# Patient Record
Sex: MALE | Race: WHITE | HISPANIC OR LATINO | Employment: FULL TIME | URBAN - METROPOLITAN AREA
[De-identification: names, ages, dates, MRNs, and addresses within clinical notes are randomized per-mention and may not be internally consistent; named-entity substitution may affect disease eponyms.]

---

## 2021-04-15 DIAGNOSIS — Z23 ENCOUNTER FOR IMMUNIZATION: ICD-10-CM

## 2023-08-25 VITALS
DIASTOLIC BLOOD PRESSURE: 92 MMHG | BODY MASS INDEX: 27.25 KG/M2 | WEIGHT: 184 LBS | HEART RATE: 78 BPM | RESPIRATION RATE: 17 BRPM | SYSTOLIC BLOOD PRESSURE: 150 MMHG | HEIGHT: 69 IN

## 2023-08-25 DIAGNOSIS — S82.831A OTHER CLOSED FRACTURE OF PROXIMAL END OF RIGHT FIBULA, INITIAL ENCOUNTER: Primary | ICD-10-CM

## 2023-08-25 PROCEDURE — 99204 OFFICE O/P NEW MOD 45 MIN: CPT | Performed by: STUDENT IN AN ORGANIZED HEALTH CARE EDUCATION/TRAINING PROGRAM

## 2023-08-25 RX ORDER — OXYCODONE HYDROCHLORIDE AND ACETAMINOPHEN 5; 325 MG/1; MG/1
TABLET ORAL
COMMUNITY
Start: 2023-08-21

## 2023-08-25 NOTE — PROGRESS NOTES
Ortho Sports Medicine Knee New Patient Visit     Assesment:   25 y.o. male right knee proximal fibula fracture    Plan:    The patient's diagnosis and treatment were discussed at length today. We discussed no treatment, non-operative treatment, and operative treatment. I explained to [facility] that he suffered a proximal fibula fracture. He can weight-bear as tolerated and does not need crutches at this time. His knee is otherwise ligamentously stable and I have low concern for any internal derangement. I did provide some work restrictions for him to stay out of work over the next 1 to 2 weeks due to the fact that he is a , followed by restrictions of no lifting greater than 25 pounds and no deep squatting down 90 degrees for an additional 4 weeks time. I will see him back in 6 weeks with repeat x-rays for reevaluation. I did advise him if he feels significantly improved he can cancel that visit and follow-up as needed    Conservative treatment:    Ice to knee for 20 minutes at least 1-2 times daily. OTC NSAIDS prn for pain. Tylenol for pain. Let pain guide gradual return activities. Imaging: All imaging from today was reviewed by myself and explained to the patient. Injection:    No Injection planned at this time. Surgery:     No surgery is recommended at this point, continue with conservative treatment plan as noted. Follow up:    No follow-ups on file. Chief Complaint   Patient presents with   • Right Lower Leg - Pain       History of Present Illness: The patient is a 25 y.o. male  who presents for evaluation of right knee injury that occurred on Sunday. He states he was on a trampoline did a back flip landing through the night off the trampoline resulting in significant onset of right knee pain. [facility] and he was provided crutches.   States since that time his pain is mild to moderate in nature. Pain is posterior lateral aspect of the knee. Denies any pain more proximally at the hip or distally at the tib-fib ankle or foot. Denies any buckling or instability of the knee. Has been ambulating with crutch assistance. He is been out of work due to the fact that he is a . 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 No pertinent past medical history. History reviewed. No pertinent surgical history. No Known Allergies  Current Outpatient Medications on File Prior to Visit   Medication Sig Dispense Refill   • oxyCODONE-acetaminophen (PERCOCET) 5-325 mg per tablet        No current facility-administered medications on file prior to visit. Social History     Socioeconomic History   • Marital status: Single     Spouse name: Not on file   • Number of children: Not on file   • Years of education: Not on file   • Highest education level: Not on file   Occupational History   • Not on file   Tobacco Use   • Smoking status: Never     Passive exposure: Never   • Smokeless tobacco: Never   Substance and Sexual Activity   • Alcohol use: Yes   • Drug use: Never   • Sexual activity: Not on file   Other Topics Concern   • Not on file   Social History Narrative   • Not on file     Social Determinants of Health     Financial Resource Strain: Not on file   Food Insecurity: Not on file   Transportation Needs: Not on file   Physical Activity: Not on file   Stress: Not on file   Social Connections: Not on file   Intimate Partner Violence: Not on file   Housing Stability: Not on file         I have reviewed the past medical, surgical, social and family history, medications and allergies as documented in the EMR. Review of systems: ROS is negative other than that noted in the HPI. Constitutional: Negative for fatigue and fever.    HENT: Negative for sore throat. Respiratory: Negative for shortness of breath. Cardiovascular: Negative for chest pain. Gastrointestinal: Negative for abdominal pain. Endocrine: Negative for cold intolerance and heat intolerance. Genitourinary: Negative for flank pain. Musculoskeletal: Negative for back pain. Skin: Negative for rash. Allergic/Immunologic: Negative for immunocompromised state. Neurological: Negative for dizziness. Psychiatric/Behavioral: Negative for agitation. Physical Exam:    Blood pressure 150/92, pulse 78, resp. rate 17, height 5' 9" (1.753 m), weight 83.5 kg (184 lb). General/Constitutional: NAD, well developed, well nourished  HENT: Normocephalic, atraumatic  CV: Intact distal pulses, regular rate  Resp: No respiratory distress or labored breathing  Lymphatic: No lymphadenopathy palpated  Neuro: Alert and Oriented x 3, no focal deficits  Psych: Normal mood, normal affect, normal judgement, normal behavior  Skin: Warm, dry, no rashes, no erythema      Knee Exam (focused):  Visual inspection of the right knee demonstrates normal contour without atrophy. No  There is no significant erythema or edema. No significant joint effusion   Mild posterior lateral swelling  Range of motion is full from 0-130 degrees of flexion   Able to straight leg raise   Proximal fibula tender to palpation  Negative negative negative medial joint line tenderness, negative lateral joint line tenderness  Negative medial Arielle's, negative lateral Arielle's  Negative Lachman exam, negative posterior drawer  Negative dial test  Stable to varus and valgus stress at both 0 and 30°  Patella tracks normally. No J sign. No apprehension. Translation is approximately 2 quadrants and is equal to the contralateral side  Patellar eversion is similar to the contralateral side    Examination of the patient's ipsilateral hip demonstrates full painless range of motion. No crepitus.       LE NV Exam: +2 DP/PT pulses bilaterally  Sensation intact to light touch L2-S1 bilaterally     Bilateral hip ROM demonstrates no pain actively or passively    No calf tenderness to palpation bilaterally    Knee Imaging    X-rays of the right knee and tib-fib demonstrate a nondisplaced proximal fibula fracture through the fibular head. No significant displacement to suggest an avulsion injury. Otherwise knee demonstrates intact tibiofemoral joint spacing patellofemoral joint spacing. No evidence of osteoarthritis. No other osseous injuries. No significant joint effusion. I do not currently have a radiologist interpretation.       Scribe Attestation    I,:   am acting as a scribe while in the presence of the attending physician.:       I,:   personally performed the services described in this documentation    as scribed in my presence.:

## 2023-08-25 NOTE — LETTER
August 25, 2023     Patient: Donny Jaramillo  YOB: 1995  Date of Visit: 8/25/2023      To Whom it May Concern:    Donny Jaramillo is under my professional care. Jorge Kathleen was seen in my office on 8/25/2023. 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 He then can return with restrictions of no lifting greater than 25 pounds, no squatting beyond 90 degrees, and to limit repetitive twisting maneuvers for an additional 4 weeks time. Beyond that I will have no restrictions for him. If you have any questions or concerns, please don't hesitate to call.          Sincerely,          Joellen Marks,         CC: No Recipients

## 2023-08-31 ENCOUNTER — TELEPHONE (OUTPATIENT)
Age: 28
End: 2023-08-31

## 2023-08-31 NOTE — TELEPHONE ENCOUNTER
Caller: Patient     Doctor: Grupo Dumont     Reason for call: Patient asked for us to fax the forms over to his job     [employer]   [phone]

## 2023-10-09 ENCOUNTER — TELEPHONE (OUTPATIENT)
Age: 28
End: 2023-10-09

## 2023-10-09 NOTE — TELEPHONE ENCOUNTER
Caller: Patient     Doctor: Duncan Huston     Reason for call: Patient asked for a letter to return to work tonight, patient feels well, please call patient letting him know letter is in       [facility] [place][phone]